# Patient Record
Sex: MALE | Race: WHITE
[De-identification: names, ages, dates, MRNs, and addresses within clinical notes are randomized per-mention and may not be internally consistent; named-entity substitution may affect disease eponyms.]

---

## 2017-12-08 ENCOUNTER — HOSPITAL ENCOUNTER (OUTPATIENT)
Dept: HOSPITAL 92 - SDC | Age: 57
Discharge: HOME | End: 2017-12-08
Attending: PODIATRIST
Payer: COMMERCIAL

## 2017-12-08 VITALS — BODY MASS INDEX: 29.5 KG/M2

## 2017-12-08 DIAGNOSIS — Z85.828: ICD-10-CM

## 2017-12-08 DIAGNOSIS — B95.61: ICD-10-CM

## 2017-12-08 DIAGNOSIS — M86.9: Primary | ICD-10-CM

## 2017-12-08 DIAGNOSIS — Z98.890: ICD-10-CM

## 2017-12-08 DIAGNOSIS — I73.9: ICD-10-CM

## 2017-12-08 DIAGNOSIS — B95.2: ICD-10-CM

## 2017-12-08 DIAGNOSIS — Z79.2: ICD-10-CM

## 2017-12-08 DIAGNOSIS — Z79.899: ICD-10-CM

## 2017-12-08 DIAGNOSIS — Z96.642: ICD-10-CM

## 2017-12-08 DIAGNOSIS — F32.9: ICD-10-CM

## 2017-12-08 DIAGNOSIS — I10: ICD-10-CM

## 2017-12-08 LAB
BASOPHILS # BLD AUTO: 0 THOU/UL (ref 0–0.2)
BASOPHILS NFR BLD AUTO: 0.9 % (ref 0–1)
EOSINOPHIL # BLD AUTO: 0.2 THOU/UL (ref 0–0.7)
EOSINOPHIL NFR BLD AUTO: 3 % (ref 0–10)
HGB BLD-MCNC: 13.8 G/DL (ref 14–18)
LYMPHOCYTES # BLD: 1.8 THOU/UL (ref 1.2–3.4)
LYMPHOCYTES NFR BLD AUTO: 33.6 % (ref 21–51)
MCH RBC QN AUTO: 29.9 PG (ref 27–31)
MCV RBC AUTO: 91.9 FL (ref 80–94)
MONOCYTES # BLD AUTO: 0.6 THOU/UL (ref 0.11–0.59)
MONOCYTES NFR BLD AUTO: 11.2 % (ref 0–10)
NEUTROPHILS # BLD AUTO: 2.8 THOU/UL (ref 1.4–6.5)
NEUTROPHILS NFR BLD AUTO: 51.4 % (ref 42–75)
PLATELET # BLD AUTO: 306 THOU/UL (ref 130–400)
RBC # BLD AUTO: 4.61 MILL/UL (ref 4.7–6.1)
WBC # BLD AUTO: 5.4 THOU/UL (ref 4.8–10.8)

## 2017-12-08 PROCEDURE — 87077 CULTURE AEROBIC IDENTIFY: CPT

## 2017-12-08 PROCEDURE — S0020 INJECTION, BUPIVICAINE HYDRO: HCPCS

## 2017-12-08 PROCEDURE — 87205 SMEAR GRAM STAIN: CPT

## 2017-12-08 PROCEDURE — 36415 COLL VENOUS BLD VENIPUNCTURE: CPT

## 2017-12-08 PROCEDURE — 0Y6T0Z0 DETACHMENT AT RIGHT 3RD TOE, COMPLETE, OPEN APPROACH: ICD-10-PCS | Performed by: PODIATRIST

## 2017-12-08 PROCEDURE — 87070 CULTURE OTHR SPECIMN AEROBIC: CPT

## 2017-12-08 PROCEDURE — 85025 COMPLETE CBC W/AUTO DIFF WBC: CPT

## 2017-12-08 PROCEDURE — 87186 SC STD MICRODIL/AGAR DIL: CPT

## 2018-08-06 ENCOUNTER — HOSPITAL ENCOUNTER (INPATIENT)
Dept: HOSPITAL 92 - SURG A | Age: 58
LOS: 4 days | Discharge: HOME | DRG: 455 | End: 2018-08-10
Attending: NEUROLOGICAL SURGERY | Admitting: NEUROLOGICAL SURGERY
Payer: COMMERCIAL

## 2018-08-06 VITALS — BODY MASS INDEX: 28.3 KG/M2

## 2018-08-06 DIAGNOSIS — Z89.429: ICD-10-CM

## 2018-08-06 DIAGNOSIS — I10: ICD-10-CM

## 2018-08-06 DIAGNOSIS — E78.00: ICD-10-CM

## 2018-08-06 DIAGNOSIS — F32.9: ICD-10-CM

## 2018-08-06 DIAGNOSIS — M47.12: Primary | ICD-10-CM

## 2018-08-06 PROCEDURE — 93010 ELECTROCARDIOGRAM REPORT: CPT

## 2018-08-06 PROCEDURE — 80048 BASIC METABOLIC PNL TOTAL CA: CPT

## 2018-08-06 PROCEDURE — C1776 JOINT DEVICE (IMPLANTABLE): HCPCS

## 2018-08-06 PROCEDURE — 76001: CPT

## 2018-08-06 PROCEDURE — C1713 ANCHOR/SCREW BN/BN,TIS/BN: HCPCS

## 2018-08-06 PROCEDURE — 85025 COMPLETE CBC W/AUTO DIFF WBC: CPT

## 2018-08-06 PROCEDURE — C1768 GRAFT, VASCULAR: HCPCS

## 2018-08-06 PROCEDURE — 93005 ELECTROCARDIOGRAM TRACING: CPT

## 2018-08-06 PROCEDURE — A4216 STERILE WATER/SALINE, 10 ML: HCPCS

## 2018-08-08 LAB
ANION GAP SERPL CALC-SCNC: 12 MMOL/L (ref 10–20)
BASOPHILS # BLD AUTO: 0 THOU/UL (ref 0–0.2)
BASOPHILS NFR BLD AUTO: 0.7 % (ref 0–1)
BUN SERPL-MCNC: 28 MG/DL (ref 8.4–25.7)
CALCIUM SERPL-MCNC: 9.1 MG/DL (ref 7.8–10.44)
CHLORIDE SERPL-SCNC: 108 MMOL/L (ref 98–107)
CO2 SERPL-SCNC: 24 MMOL/L (ref 22–29)
CREAT CL PREDICTED SERPL C-G-VRATE: 128 ML/MIN (ref 70–130)
EOSINOPHIL # BLD AUTO: 0.2 THOU/UL (ref 0–0.7)
EOSINOPHIL NFR BLD AUTO: 4 % (ref 0–10)
GLUCOSE SERPL-MCNC: 97 MG/DL (ref 70–105)
HGB BLD-MCNC: 14.2 G/DL (ref 14–18)
LYMPHOCYTES # BLD: 2.2 THOU/UL (ref 1.2–3.4)
LYMPHOCYTES NFR BLD AUTO: 37.1 % (ref 21–51)
MCH RBC QN AUTO: 31.5 PG (ref 27–31)
MCV RBC AUTO: 90.4 FL (ref 78–98)
MONOCYTES # BLD AUTO: 0.6 THOU/UL (ref 0.11–0.59)
MONOCYTES NFR BLD AUTO: 10.2 % (ref 0–10)
NEUTROPHILS # BLD AUTO: 2.9 THOU/UL (ref 1.4–6.5)
NEUTROPHILS NFR BLD AUTO: 48.1 % (ref 42–75)
PLATELET # BLD AUTO: 246 THOU/UL (ref 130–400)
POTASSIUM SERPL-SCNC: 3.8 MMOL/L (ref 3.5–5.1)
RBC # BLD AUTO: 4.49 MILL/UL (ref 4.7–6.1)
SODIUM SERPL-SCNC: 140 MMOL/L (ref 136–145)
WBC # BLD AUTO: 5.9 THOU/UL (ref 4.8–10.8)

## 2018-08-08 PROCEDURE — 0RG1071 FUSION OF CERVICAL VERTEBRAL JOINT WITH AUTOLOGOUS TISSUE SUBSTITUTE, POSTERIOR APPROACH, POSTERIOR COLUMN, OPEN APPROACH: ICD-10-PCS | Performed by: NEUROLOGICAL SURGERY

## 2018-08-08 PROCEDURE — 0RB30ZZ EXCISION OF CERVICAL VERTEBRAL DISC, OPEN APPROACH: ICD-10-PCS | Performed by: NEUROLOGICAL SURGERY

## 2018-08-08 PROCEDURE — 0RG10AJ FUSION OF CERVICAL VERTEBRAL JOINT WITH INTERBODY FUSION DEVICE, POSTERIOR APPROACH, ANTERIOR COLUMN, OPEN APPROACH: ICD-10-PCS | Performed by: NEUROLOGICAL SURGERY

## 2018-08-08 RX ADMIN — Medication SCH GM: at 21:34

## 2018-08-08 RX ADMIN — Medication SCH GM: at 14:00

## 2018-08-08 RX ADMIN — HYDROCODONE BITARTRATE AND ACETAMINOPHEN PRN TAB: 10; 325 TABLET ORAL at 21:33

## 2018-08-08 RX ADMIN — Medication SCH: at 21:40

## 2018-08-08 NOTE — OP
DATE OF PROCEDURE:  08/08/2018

 

SURGEON:  Néstor Sandoval M.D.

 

ASSISTANT:  ALYSIA Fernández

 

PROCEDURE:  Anterior cervical discectomy C3-4, interbody arthrodesis, intravertebral biomechanical de
vice, local morselized autograft, demineralized bone matrix, anterior titanium instrumentation C3-4, 
posterior approach C3-4 laminectomy, posterolateral arthrodesis, lateral mass screw instrumentation, 
demineralized bone matrix, local morselized autograft C3-C4.

 

PROCEDURE IN DETAIL:  The patient was brought to the operating room intubated and positioned supine i
n modest extension on a gel-filled donut.  Incision was made in the right precervical area and dissec
ting medial to the sternocleidomastoid muscle, identified the anterior cervical spine and our level w
as confirmed by x-ray.  We placed distraction across C3-4, debrided anterior osteophytes and using th
e operating microscope and microdissection techniques, completely decompressed the spinal cord from f
oramen to foramen.  The bony endplates were then decorticated for the purpose of arthrodesis and appr
opriately sized intravertebral biomechanical PEEK device was brought into the field, filled with obi
neralized bone matrix, local morselized autograft, and tapped into place securely at C3-4.  Next, an 
anterior plate was brought in the field and secured to C3 and C4 using two 14 mm screws at each level
.  The wound was then extensively irrigated, immaculate hemostasis was secured, and the wound was tequila
sed in anatomic layers.  

 

The patient was then rolled into the prone position on gel-filled chest rolls with the head fixed in 
a neutral position in the keven head ornelas.  A midline cervical incision was made exposing C3 and C
4 and our level was confirmed by x-ray.  We performed complete C4 and complete C3 laminectomies compl
etely decompressing the spinal cord.  Lateral mass screws were then placed at right C3, right C4, sec
ured by jeff, connected by nuts which were final tightened.  The wound was then extensively irrigated,
 immaculate hemostasis was secured.  A combination of demineralized bone matrix, local morselized aut
ograft was laid over the laminar and posterolateral surfaces for the purpose of arthrodesis.  Vancomy
dank powder was applied and the wound was closed in anatomic layers.

## 2018-08-09 RX ADMIN — Medication SCH ML: at 08:22

## 2018-08-09 RX ADMIN — THERA TABS SCH TAB: TAB at 08:22

## 2018-08-09 RX ADMIN — BUPROPION HYDROCHLORIDE SCH MG: 150 TABLET, FILM COATED, EXTENDED RELEASE ORAL at 08:20

## 2018-08-09 RX ADMIN — HYDROCODONE BITARTRATE AND ACETAMINOPHEN PRN TAB: 10; 325 TABLET ORAL at 21:15

## 2018-08-09 RX ADMIN — Medication SCH ML: at 21:12

## 2018-08-09 NOTE — PRG
DATE OF SERVICE:  08/09/2018

 

Postoperative day #1, status post C3-4 anterior and posterior decompression and 
fusion.  Overnight, the patient reports his pain was well controlled, although 
he did require p.r.n. doses of morphine.  He is also using a muscle relaxer 
which he reports is significantly helpful with muscle spasms between the 
shoulder blades and along the back of the neck.  He is tolerating a regular 
diet and up walking in the department.  The patient did have some urinary 
retention overnight which required straight cath x1.  We will continue to 
monitor his urinary output with intermittent bladder scans throughout the day. 



Pt is comfortable, NAD. He has improved strength and less dysesthesias in his 
LUE. Incisions are dry and soft. 



If his urinary retention resolves then I feel that he can likely be discharged 
this afternoon or in the morning. We will continue to monitor and nursing will 
notify me of his progress to assess whether he can be discharged this afternoon 
or reassess in the morning.

 

DAKOTA

## 2018-08-10 VITALS — DIASTOLIC BLOOD PRESSURE: 97 MMHG | TEMPERATURE: 98.5 F | SYSTOLIC BLOOD PRESSURE: 162 MMHG

## 2018-08-10 RX ADMIN — BUPROPION HYDROCHLORIDE SCH MG: 150 TABLET, FILM COATED, EXTENDED RELEASE ORAL at 09:59

## 2018-08-10 RX ADMIN — Medication SCH ML: at 10:03

## 2018-08-10 RX ADMIN — HYDROCODONE BITARTRATE AND ACETAMINOPHEN PRN TAB: 10; 325 TABLET ORAL at 10:58

## 2018-08-10 RX ADMIN — THERA TABS SCH TAB: TAB at 10:00

## 2018-08-10 NOTE — DIS
DATE OF ADMISSION:  08/08/2018

 

DATE OF DISCHARGE:  08/10/2018

 

ATTENDING PHYSICIAN:  Dr. Néstor Sandoval 

 

HOSPITAL COURSE:  Patient is a 57-year-old  male status post C3-4 anterior and posterior dec
ompression and fusion.  He is postoperative day #2.  He reports his pain is well controlled with p.o.
 medications, he is tolerating regular diet.  Postoperatively, the patient did have some urinary rete
ntion which required p.r.n. straight cath x2.  He is now voiding appropriately and no longer retainin
g.  He does have a history of BPH and is taking Flomax 0.4 mg b.i.d.  We will continue this medicatio
n and will recommend outpatient Urology followup.  With regards to his postoperative incision, it emmanuel
ears to be healing well and is soft in the anterior portion and remains dry over both dressings.  He 
has some improved sensation in the left upper extremity and feels like he may be improving some stren
gth, although this is not appreciated on my exam at this time.  At this point, I feel the patient cou
ld be appropriately discharged to home and we will plan to follow up in the office in 2 weeks.  I hav
e discussed home care precautions and provided patient with prescriptions for hydrocodone, Zanaflex a
nd Keflex.  Please reach out to Neurosurgery for additional questions or concerns.

## 2018-08-16 ENCOUNTER — HOSPITAL ENCOUNTER (OUTPATIENT)
Dept: HOSPITAL 92 - TBSIIMAG | Age: 58
Discharge: HOME | End: 2018-08-16
Attending: PHYSICIAN ASSISTANT
Payer: COMMERCIAL

## 2018-08-16 DIAGNOSIS — Z98.1: ICD-10-CM

## 2018-08-16 DIAGNOSIS — G95.9: ICD-10-CM

## 2018-08-16 DIAGNOSIS — M48.8X2: ICD-10-CM

## 2018-08-16 DIAGNOSIS — M48.02: Primary | ICD-10-CM

## 2018-08-16 PROCEDURE — 72040 X-RAY EXAM NECK SPINE 2-3 VW: CPT

## 2018-08-16 NOTE — RAD
CERVICAL SPINE THREE VIEWS:

 

HISTORY:

Cervical spine stenosis.  Status post surgery.  Follow-up exam.

 

COMPARISON:

None.

 

FINDINGS:

Three views of the cervical spine demonstrate an anterior fusion plate with transvertebral body screw
s at C3 and C4.  There is associated posterior element fusion hardware at C3 and C4, traversing the r
ight facet.  No obvious perihardware lucency.  There is a prosthesis at C3-C4 with the anterior aspec
t of the prosthesis that is just at the anterior-inferior aspect of C3 and slightly superior to the a
nterior-superior aspect of C4.  There is severe loss of disk space height and osteophyte formation at
 C4-C5 and C5-C6 and likely at C6-C7.  There is some loss of vertebral body height at C5 and C6, as w
ell as C7.  There is minimal prevertebral soft tissue swelling.  Posterior skin staples are noted.

 

IMPRESSION:

1.  Fusion changes and post surgical changes as above.

 

2.  Presumed chronic changes with loss of disk space height and loss of vertebral body height, as alfredo
cribed above.

 

POS: CHINYERE

## 2018-10-02 ENCOUNTER — HOSPITAL ENCOUNTER (OUTPATIENT)
Dept: HOSPITAL 92 - TBSIIMAG | Age: 58
Discharge: HOME | End: 2018-10-02
Attending: NEUROLOGICAL SURGERY
Payer: COMMERCIAL

## 2018-10-02 DIAGNOSIS — M48.061: Primary | ICD-10-CM

## 2018-10-02 PROCEDURE — 72040 X-RAY EXAM NECK SPINE 2-3 VW: CPT

## 2018-10-02 NOTE — RAD
CERVICAL SPINE THREE VIEWS:

 

History: Follow up after surgery. 

 

FINDINGS: 

Anterior cervical fusion with plate and screws at the C3-4 level are noted. Minimal anterolisthesis o
f C2 on C3 and some minimal anterolisthesis of C3 on C4, felt to be fairly similar. Markers of the di
sc implant remain in the confines of the disc level. Right sided facet screws are also seen at the C3
-4 level. Severe disc narrowing again demonstrated in the lower cervical spine. 

 

IMPRESSION: 

Stable exam. 

 

POS: MARILUZC

## 2018-10-24 ENCOUNTER — HOSPITAL ENCOUNTER (OUTPATIENT)
Dept: HOSPITAL 92 - MRI | Age: 58
Discharge: HOME | End: 2018-10-24
Attending: NEUROLOGICAL SURGERY
Payer: COMMERCIAL

## 2018-10-24 DIAGNOSIS — M47.26: Primary | ICD-10-CM

## 2018-10-24 PROCEDURE — 72148 MRI LUMBAR SPINE W/O DYE: CPT

## 2018-10-25 NOTE — MRI
MRI LUMBAR SPINE WITHOUT CONTRAST:

 

Date:  10/24/18 

 

HISTORY:  

Low back pain and left thigh pain. Neuropathy. 

 

COMPARISON:  

MRI lumbar spine dated 02/23/17. 

 

FINDINGS:

The aortic contour is nonaneurysmal. No retroperitoneal adenopathy. No hydronephrosis. 

 

Conus medullaris terminates near the superior end plate of L1. 

 

There is crowding of the nerve roots throughout the lumbar spine due to increased anterior and poster
ior epidural fat and ligamentum flavum hypertrophy, as well as posterior disc osteophyte complexes. 

 

No marrow infiltrative process. There is unchanged appearance, likely a fat pore L2 vertebral hemangi
sophia. Modic Type I and II changes at L1 and L2. 

 

Levels are as follows:

 

L1-2:  Severe degenerative disc space height loss. Circumferential disc osteophyte complex. Severe fa
cet arthropathy. The posterior disc osteophyte complex narrows the spinal canal to approximately 5.0 
mm. Abnormal increased posterior epidural fat. Severe left and moderate to severe right-sided neural 
foraminal narrowing with abutment of the exiting nerve roots. 

 

L2-3:  Moderate degenerative disc space height loss. Circumferential disc bulge. Abnormally increased
 posterior epidural fat. Spinal canal is narrowed to approximately 4.0 mm. Moderate to severe facet a
rthropathy. Moderate bilateral neural foraminal narrowing with abutment of the exiting nerve roots bi
laterally. 

 

L3-4:  Severe degenerative disc space height loss. Circumferential disc osteophyte complex. Severe fa
cet arthropathy. Severe bilateral neural foraminal narrowing with abutment of the exiting and deandre
ing nerve roots. Abnormally increased posterior epidural fat and ligamentum flavum hypertrophy. Spina
l canal measures approximately 4.0 mm. 

 

L4-5:  There is severe degenerative facet arthropathy. Circumferential disc bulge. There is moderate 
to severe left and moderate right-sided neural foraminal narrowing with abutment of the exiting and t
raversing nerve roots on the left. 

 

L5-S1:  Severe degenerative disc space height loss posteriorly. Severe facet arthrosis. Combination o
f these findings causes moderate to severe bilateral neural foraminal narrowing with abutment of the 
exiting and traversing nerve roots bilaterally. 

 

IMPRESSION: 

Markedly advanced spondylosis as described above, which is mildly progressed from the comparison exam
ination. 

 

 

POS: OFF

## 2019-07-24 ENCOUNTER — HOSPITAL ENCOUNTER (OUTPATIENT)
Dept: HOSPITAL 92 - SCSRAD | Age: 59
Discharge: HOME | End: 2019-07-24
Attending: PODIATRIST
Payer: COMMERCIAL

## 2019-07-24 DIAGNOSIS — M79.89: ICD-10-CM

## 2019-07-24 DIAGNOSIS — M25.571: ICD-10-CM

## 2019-07-24 DIAGNOSIS — R60.0: Primary | ICD-10-CM

## 2019-07-24 DIAGNOSIS — M19.071: ICD-10-CM

## 2019-07-24 DIAGNOSIS — M79.671: ICD-10-CM

## 2019-07-24 NOTE — RAD
Right ankle 3 views



HISTORY: Right ankle pain and swelling.



FINDINGS: Ankle mortise and talar dome are intact. Mild/moderate osteophytosis. Soft tissue swelling 
about the ankle.



Abnormalities of the foot are detailed in a separate report.







IMPRESSION: Soft tissue swelling. Degenerative changes about the ankle. No acute osseous abnormalitie
s are demonstrated.



Reported By: JAREK Sales 

Electronically Signed:  7/24/2019 5:03 PM

## 2019-07-24 NOTE — RAD
Right foot 3 views



HISTORY: Foot pain. Swelling.



FINDINGS: Lisfranc joint alignment is anatomic. Pes planus on the lateral view. Joint space narrowing
 throughout the hindfoot and midfoot. Extensive subcortical cystic change throughout the mid foot.

Osteophytosis most pronounced at the distal tibia. Soft tissue swelling about the midfoot. Amputation
 of the third toe at the base of the proximal phalanx.







IMPRESSION: Extensive subcortical cystic change throughout the mid foot. More aggressive processes arauz
ch as neuropathic joint or infection must be considered. Please correlate with clinical findings

and history. Soft tissue swelling is also apparent about the midfoot and forefoot.



Amputation of the third toe.



Reported By: JAREK Sales 

Electronically Signed:  7/24/2019 5:02 PM

## 2022-01-13 ENCOUNTER — HOSPITAL ENCOUNTER (OUTPATIENT)
Dept: HOSPITAL 92 - LABBT | Age: 62
Discharge: HOME | End: 2022-01-13
Attending: UROLOGY
Payer: COMMERCIAL

## 2022-01-13 DIAGNOSIS — Z20.822: ICD-10-CM

## 2022-01-13 DIAGNOSIS — N40.0: ICD-10-CM

## 2022-01-13 DIAGNOSIS — Z01.818: Primary | ICD-10-CM

## 2022-01-13 LAB
ANION GAP SERPL CALC-SCNC: 13 MMOL/L (ref 10–20)
BACTERIA UR QL AUTO: (no result) HPF
BUN SERPL-MCNC: 20 MG/DL (ref 8.4–25.7)
CALCIUM SERPL-MCNC: 9 MG/DL (ref 7.8–10.44)
CHLORIDE SERPL-SCNC: 109 MMOL/L (ref 98–107)
CO2 SERPL-SCNC: 22 MMOL/L (ref 23–31)
CREAT CL PREDICTED SERPL C-G-VRATE: 0 ML/MIN (ref 70–130)
GLUCOSE SERPL-MCNC: 116 MG/DL (ref 80–115)
HGB BLD-MCNC: 14 G/DL (ref 13.5–17.5)
MCH RBC QN AUTO: 29.4 PG (ref 27–33)
MCV RBC AUTO: 90.4 FL (ref 81.2–95.1)
PLATELET # BLD AUTO: 253 10X3/UL (ref 150–450)
POTASSIUM SERPL-SCNC: 4 MMOL/L (ref 3.5–5.1)
RBC # BLD AUTO: 4.77 10X6/UL (ref 4.32–5.72)
RBC UR QL AUTO: (no result) HPF (ref 0–3)
SODIUM SERPL-SCNC: 140 MMOL/L (ref 136–145)
SP GR UR STRIP: 1.02 (ref 1–1.04)
WBC # BLD AUTO: 5.3 10X3/UL (ref 3.5–10.5)
WBC UR QL AUTO: (no result) HPF (ref 0–3)

## 2022-01-13 PROCEDURE — 81001 URINALYSIS AUTO W/SCOPE: CPT

## 2022-01-13 PROCEDURE — 93005 ELECTROCARDIOGRAM TRACING: CPT

## 2022-01-13 PROCEDURE — 93010 ELECTROCARDIOGRAM REPORT: CPT

## 2022-01-13 PROCEDURE — 80048 BASIC METABOLIC PNL TOTAL CA: CPT

## 2022-01-13 PROCEDURE — 85027 COMPLETE CBC AUTOMATED: CPT

## 2022-01-13 PROCEDURE — U0003 INFECTIOUS AGENT DETECTION BY NUCLEIC ACID (DNA OR RNA); SEVERE ACUTE RESPIRATORY SYNDROME CORONAVIRUS 2 (SARS-COV-2) (CORONAVIRUS DISEASE [COVID-19]), AMPLIFIED PROBE TECHNIQUE, MAKING USE OF HIGH THROUGHPUT TECHNOLOGIES AS DESCRIBED BY CMS-2020-01-R: HCPCS

## 2022-01-13 PROCEDURE — 87086 URINE CULTURE/COLONY COUNT: CPT

## 2022-01-13 PROCEDURE — U0005 INFEC AGEN DETEC AMPLI PROBE: HCPCS

## 2023-05-01 VITALS — SYSTOLIC BLOOD PRESSURE: 139 MMHG | DIASTOLIC BLOOD PRESSURE: 98 MMHG

## 2023-05-02 ENCOUNTER — HOSPITAL ENCOUNTER (OUTPATIENT)
Dept: HOSPITAL 46 - DS | Age: 63
Discharge: HOME | End: 2023-05-02
Attending: SURGERY
Payer: COMMERCIAL

## 2023-05-02 VITALS — SYSTOLIC BLOOD PRESSURE: 143 MMHG | DIASTOLIC BLOOD PRESSURE: 72 MMHG

## 2023-05-02 VITALS — SYSTOLIC BLOOD PRESSURE: 142 MMHG | DIASTOLIC BLOOD PRESSURE: 92 MMHG

## 2023-05-02 VITALS — BODY MASS INDEX: 33.13 KG/M2 | WEIGHT: 280.58 LBS | HEIGHT: 77 IN

## 2023-05-02 VITALS — SYSTOLIC BLOOD PRESSURE: 130 MMHG | DIASTOLIC BLOOD PRESSURE: 81 MMHG

## 2023-05-02 VITALS — SYSTOLIC BLOOD PRESSURE: 135 MMHG | DIASTOLIC BLOOD PRESSURE: 88 MMHG

## 2023-05-02 DIAGNOSIS — Z79.899: ICD-10-CM

## 2023-05-02 DIAGNOSIS — M62.522: ICD-10-CM

## 2023-05-02 DIAGNOSIS — I10: ICD-10-CM

## 2023-05-02 DIAGNOSIS — K42.9: ICD-10-CM

## 2023-05-02 DIAGNOSIS — Z96.649: ICD-10-CM

## 2023-05-02 DIAGNOSIS — K40.90: Primary | ICD-10-CM

## 2023-05-02 PROCEDURE — C1781 MESH (IMPLANTABLE): HCPCS

## 2023-11-09 ENCOUNTER — HOSPITAL ENCOUNTER (OUTPATIENT)
Dept: HOSPITAL 46 - OPS | Age: 63
Discharge: HOME | End: 2023-11-09
Attending: SURGERY
Payer: COMMERCIAL

## 2023-11-09 VITALS — SYSTOLIC BLOOD PRESSURE: 135 MMHG | DIASTOLIC BLOOD PRESSURE: 89 MMHG

## 2023-11-09 VITALS — HEIGHT: 77 IN | BODY MASS INDEX: 32.54 KG/M2 | WEIGHT: 275.58 LBS

## 2023-11-09 VITALS — SYSTOLIC BLOOD PRESSURE: 121 MMHG | DIASTOLIC BLOOD PRESSURE: 82 MMHG

## 2023-11-09 DIAGNOSIS — Z79.899: ICD-10-CM

## 2023-11-09 DIAGNOSIS — K57.30: ICD-10-CM

## 2023-11-09 DIAGNOSIS — I10: ICD-10-CM

## 2023-11-09 DIAGNOSIS — Z12.11: Primary | ICD-10-CM

## 2023-11-09 DIAGNOSIS — K64.8: ICD-10-CM

## 2023-11-09 DIAGNOSIS — Z96.649: ICD-10-CM

## 2023-11-09 PROCEDURE — 0DJD8ZZ INSPECTION OF LOWER INTESTINAL TRACT, VIA NATURAL OR ARTIFICIAL OPENING ENDOSCOPIC: ICD-10-PCS | Performed by: SURGERY

## 2023-11-09 PROCEDURE — G0500 MOD SEDAT ENDO SERVICE >5YRS: HCPCS
